# Patient Record
Sex: FEMALE | Race: WHITE
[De-identification: names, ages, dates, MRNs, and addresses within clinical notes are randomized per-mention and may not be internally consistent; named-entity substitution may affect disease eponyms.]

---

## 2018-02-02 PROBLEM — Z00.00 ENCOUNTER FOR PREVENTIVE HEALTH EXAMINATION: Status: ACTIVE | Noted: 2018-02-02

## 2018-02-06 ENCOUNTER — TRANSCRIPTION ENCOUNTER (OUTPATIENT)
Age: 23
End: 2018-02-06

## 2018-02-07 ENCOUNTER — APPOINTMENT (OUTPATIENT)
Dept: ORTHOPEDIC SURGERY | Facility: CLINIC | Age: 23
End: 2018-02-07
Payer: COMMERCIAL

## 2018-02-07 VITALS — BODY MASS INDEX: 27.32 KG/M2 | HEIGHT: 66 IN | WEIGHT: 170 LBS

## 2018-02-07 DIAGNOSIS — Z78.9 OTHER SPECIFIED HEALTH STATUS: ICD-10-CM

## 2018-02-07 DIAGNOSIS — M75.20 BICIPITAL TENDINITIS, UNSPECIFIED SHOULDER: ICD-10-CM

## 2018-02-07 DIAGNOSIS — Z86.39 PERSONAL HISTORY OF OTHER ENDOCRINE, NUTRITIONAL AND METABOLIC DISEASE: ICD-10-CM

## 2018-02-07 PROCEDURE — 20606 DRAIN/INJ JOINT/BURSA W/US: CPT | Mod: RT

## 2018-02-07 PROCEDURE — 99204 OFFICE O/P NEW MOD 45 MIN: CPT | Mod: 25

## 2018-02-07 RX ORDER — LEVOTHYROXINE SODIUM 0.03 MG/1
25 TABLET ORAL
Refills: 0 | Status: ACTIVE | COMMUNITY

## 2018-02-07 RX ORDER — MELOXICAM 15 MG/1
15 TABLET ORAL DAILY
Qty: 30 | Refills: 0 | Status: ACTIVE | COMMUNITY
Start: 2018-02-07 | End: 1900-01-01

## 2018-03-22 ENCOUNTER — FORM ENCOUNTER (OUTPATIENT)
Age: 23
End: 2018-03-22

## 2018-03-23 ENCOUNTER — OUTPATIENT (OUTPATIENT)
Dept: OUTPATIENT SERVICES | Facility: HOSPITAL | Age: 23
LOS: 1 days | End: 2018-03-23

## 2018-03-23 ENCOUNTER — APPOINTMENT (OUTPATIENT)
Dept: ORTHOPEDIC SURGERY | Facility: CLINIC | Age: 23
End: 2018-03-23
Payer: COMMERCIAL

## 2018-03-23 ENCOUNTER — APPOINTMENT (OUTPATIENT)
Dept: RADIOLOGY | Facility: CLINIC | Age: 23
End: 2018-03-23
Payer: COMMERCIAL

## 2018-03-23 VITALS — HEIGHT: 66 IN | WEIGHT: 170 LBS | BODY MASS INDEX: 27.32 KG/M2 | RESPIRATION RATE: 16 BRPM

## 2018-03-23 PROCEDURE — 99214 OFFICE O/P EST MOD 30 MIN: CPT | Mod: 25

## 2018-03-23 PROCEDURE — 73030 X-RAY EXAM OF SHOULDER: CPT | Mod: 26,RT

## 2018-03-23 PROCEDURE — 73030 X-RAY EXAM OF SHOULDER: CPT | Mod: RT

## 2018-03-23 PROCEDURE — 20611 DRAIN/INJ JOINT/BURSA W/US: CPT | Mod: RT

## 2018-05-25 ENCOUNTER — APPOINTMENT (OUTPATIENT)
Dept: ORTHOPEDIC SURGERY | Facility: CLINIC | Age: 23
End: 2018-05-25

## 2018-06-04 ENCOUNTER — EMERGENCY (EMERGENCY)
Facility: HOSPITAL | Age: 23
LOS: 1 days | Discharge: HOME | End: 2018-06-04
Admitting: EMERGENCY MEDICINE
Payer: SELF-PAY

## 2018-06-04 VITALS
WEIGHT: 164.02 LBS | DIASTOLIC BLOOD PRESSURE: 80 MMHG | OXYGEN SATURATION: 99 % | RESPIRATION RATE: 16 BRPM | TEMPERATURE: 98 F | HEART RATE: 106 BPM | SYSTOLIC BLOOD PRESSURE: 121 MMHG

## 2018-06-04 DIAGNOSIS — M25.512 PAIN IN LEFT SHOULDER: ICD-10-CM

## 2018-06-04 PROCEDURE — 73030 X-RAY EXAM OF SHOULDER: CPT | Mod: 26,LT

## 2018-06-04 PROCEDURE — 99283 EMERGENCY DEPT VISIT LOW MDM: CPT | Mod: 25

## 2018-06-04 NOTE — ED PROVIDER NOTE - MUSCULOSKELETAL, MLM
Spine appears normal, range of motion is not limited, +tenderness to anterior left shoulder, distally NVI

## 2018-06-04 NOTE — ED PROVIDER NOTE - DIAGNOSTIC INTERPRETATION
Interpreted by ED physician  left shoulder x-ray, 3 views  No fracture, no dislocation (joint spaces grossly normal), no Foreign Body noted, soft tissue normal

## 2018-06-04 NOTE — ED PROVIDER NOTE - OBJECTIVE STATEMENT
Pt is 23 yo female with no sig pmhx who presents with left shoulder pain x 2 days that began suddenly while lifting and heard a "pop".  Pt denies any weakness, chest pain, sob, numbness, tingling or neck pain.  Pt reports pain worse with movement.  Denies any relieving symptoms.

## 2018-06-04 NOTE — ED ADULT NURSE NOTE - CHPI ED SYMPTOMS NEG
no tingling/no deformity/no bruising/no back pain/no stiffness/no fever/no abrasion/no weakness/no numbness/no difficulty bearing weight

## 2018-06-04 NOTE — ED ADULT TRIAGE NOTE - CHIEF COMPLAINT QUOTE
Pt with complaint of left shoulder pain since Saturday. States she heard a pop on the affected area at that time and has had pain and limited range of motion. No deformities noted.

## 2018-06-13 ENCOUNTER — APPOINTMENT (OUTPATIENT)
Dept: ORTHOPEDIC SURGERY | Facility: CLINIC | Age: 23
End: 2018-06-13

## 2018-07-16 ENCOUNTER — APPOINTMENT (OUTPATIENT)
Dept: ORTHOPEDIC SURGERY | Facility: CLINIC | Age: 23
End: 2018-07-16
Payer: COMMERCIAL

## 2018-07-16 VITALS — RESPIRATION RATE: 16 BRPM | WEIGHT: 170 LBS | BODY MASS INDEX: 27.32 KG/M2 | HEIGHT: 66 IN

## 2018-07-16 DIAGNOSIS — G25.89 OTHER SPECIFIED EXTRAPYRAMIDAL AND MOVEMENT DISORDERS: ICD-10-CM

## 2018-07-16 DIAGNOSIS — M75.21 BICIPITAL TENDINITIS, RIGHT SHOULDER: ICD-10-CM

## 2018-07-16 DIAGNOSIS — M19.011 PRIMARY OSTEOARTHRITIS, RIGHT SHOULDER: ICD-10-CM

## 2018-07-16 PROCEDURE — 99213 OFFICE O/P EST LOW 20 MIN: CPT

## 2018-11-06 ENCOUNTER — EMERGENCY (EMERGENCY)
Facility: HOSPITAL | Age: 23
LOS: 1 days | Discharge: HOME | End: 2018-11-06
Attending: EMERGENCY MEDICINE | Admitting: EMERGENCY MEDICINE
Payer: COMMERCIAL

## 2018-11-06 VITALS
SYSTOLIC BLOOD PRESSURE: 112 MMHG | DIASTOLIC BLOOD PRESSURE: 75 MMHG | RESPIRATION RATE: 18 BRPM | TEMPERATURE: 98 F | OXYGEN SATURATION: 99 % | HEART RATE: 83 BPM

## 2018-11-06 DIAGNOSIS — S03.02XA DISLOCATION OF JAW, LEFT SIDE, INITIAL ENCOUNTER: ICD-10-CM

## 2018-11-06 DIAGNOSIS — K13.79 OTHER LESIONS OF ORAL MUCOSA: ICD-10-CM
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
## 2018-11-06 PROCEDURE — 99284 EMERGENCY DEPT VISIT MOD MDM: CPT

## 2018-11-06 PROCEDURE — 70110 X-RAY EXAM OF JAW 4/> VIEWS: CPT | Mod: 26

## 2018-11-06 RX ORDER — KETOROLAC TROMETHAMINE 30 MG/ML
60 SYRINGE (ML) INJECTION ONCE
Refills: 0 | Status: DISCONTINUED | OUTPATIENT
Start: 2018-11-06 | End: 2018-11-06

## 2018-11-06 RX ADMIN — Medication 30 MILLIGRAM(S): at 21:43

## 2018-11-06 RX ADMIN — Medication 60 MILLIGRAM(S): at 21:20

## 2018-11-06 NOTE — ED ADULT NURSE NOTE - NSIMPLEMENTINTERV_GEN_ALL_ED
Implemented All Universal Safety Interventions:  [place] to call system. Call bell, personal items and telephone within reach. Instruct patient to call for assistance. Room bathroom lighting operational. Non-slip footwear when patient is off stretcher. Physically safe environment: no spills, clutter or unnecessary equipment. Stretcher in lowest position, wheels locked, appropriate side rails in place.

## 2018-11-07 VITALS
DIASTOLIC BLOOD PRESSURE: 61 MMHG | HEART RATE: 80 BPM | RESPIRATION RATE: 18 BRPM | OXYGEN SATURATION: 99 % | SYSTOLIC BLOOD PRESSURE: 104 MMHG

## 2018-11-07 PROCEDURE — 70486 CT MAXILLOFACIAL W/O DYE: CPT | Mod: 26

## 2018-11-07 RX ORDER — IBUPROFEN 200 MG
1 TABLET ORAL
Qty: 28 | Refills: 0
Start: 2018-11-07 | End: 2018-11-13

## 2018-11-07 RX ORDER — CYCLOBENZAPRINE HYDROCHLORIDE 10 MG/1
10 TABLET, FILM COATED ORAL ONCE
Refills: 0 | Status: COMPLETED | OUTPATIENT
Start: 2018-11-07 | End: 2018-11-07

## 2018-11-07 RX ORDER — CYCLOBENZAPRINE HYDROCHLORIDE 10 MG/1
1 TABLET, FILM COATED ORAL
Qty: 15 | Refills: 0
Start: 2018-11-07 | End: 2018-11-11

## 2018-11-07 RX ADMIN — CYCLOBENZAPRINE HYDROCHLORIDE 10 MILLIGRAM(S): 10 TABLET, FILM COATED ORAL at 01:27

## 2018-11-07 NOTE — ED PROVIDER NOTE - MOUTH
mild ttp over L lateral mandible in region of condyle, no crepitus, no echymosis or deformities. patient with mild trismus, but able to almost fully open mouth, mild protrusion of lower incisors with smiling. no clicking, no crepitus. no deformities otherwise.

## 2018-11-07 NOTE — ED PROVIDER NOTE - PROGRESS NOTE DETAILS
spoke to PMFS on call at [facility]. Dr Josef Ackerman, she can see the patient tomorrow in the office. patient agrees with this plan. relocation attempted at bedside, not successful with manual relocation attempt. patient has likely been dislocated for 2 weeks. advised strict follow up with omfs, but at this time, patient notes she has exams in school and will call tomorrow for follow up but it may coincide with exams. advised strict follow up for relocation, or return to ED for omfs consultation. agrees with plan

## 2018-11-07 NOTE — ED PROVIDER NOTE - CARE PROVIDER_API CALL
Josef Ackerman (MD; DDS), Otolaryngology  Head and Neck Surgery  [address]  [place] [address]  Phone: [phone]  Fax: [phone]

## 2018-11-07 NOTE — ED PROVIDER NOTE - OBJECTIVE STATEMENT
patient with no pmhx, presents with 2 weeks of jaw tightness, and L sided swelling. patient notes hx of TMJ, with dislocation in the past, s/p botox injections and relocation with specialist. patient notes symptoms started 2 weeks ago while eating something solid, and hard. felt a tightness to L side, and since then with mildly protruding lower jaw. denies drooling, N/V, or inability to eat or drink. denies headache. denies neck pain or stiffness.

## 2019-07-27 ENCOUNTER — TRANSCRIPTION ENCOUNTER (OUTPATIENT)
Age: 24
End: 2019-07-27

## 2019-09-11 ENCOUNTER — APPOINTMENT (OUTPATIENT)
Dept: ORTHOPEDIC SURGERY | Facility: CLINIC | Age: 24
End: 2019-09-11
Payer: MEDICAID

## 2019-09-11 VITALS — RESPIRATION RATE: 16 BRPM | WEIGHT: 170 LBS | HEIGHT: 66 IN | BODY MASS INDEX: 27.32 KG/M2

## 2019-09-11 DIAGNOSIS — M25.461 EFFUSION, RIGHT KNEE: ICD-10-CM

## 2019-09-11 PROCEDURE — 99214 OFFICE O/P EST MOD 30 MIN: CPT

## 2019-09-16 ENCOUNTER — APPOINTMENT (OUTPATIENT)
Dept: MRI IMAGING | Facility: CLINIC | Age: 24
End: 2019-09-16

## 2020-10-21 ENCOUNTER — APPOINTMENT (OUTPATIENT)
Dept: ORTHOPEDIC SURGERY | Facility: CLINIC | Age: 25
End: 2020-10-21
Payer: COMMERCIAL

## 2020-10-21 ENCOUNTER — RESULT REVIEW (OUTPATIENT)
Age: 25
End: 2020-10-21

## 2020-10-21 ENCOUNTER — OUTPATIENT (OUTPATIENT)
Dept: OUTPATIENT SERVICES | Facility: HOSPITAL | Age: 25
LOS: 1 days | End: 2020-10-21
Payer: COMMERCIAL

## 2020-10-21 VITALS — RESPIRATION RATE: 16 BRPM | HEIGHT: 66 IN | BODY MASS INDEX: 27.32 KG/M2 | WEIGHT: 170 LBS

## 2020-10-21 DIAGNOSIS — M22.8X1 OTHER DISORDERS OF PATELLA, RIGHT KNEE: ICD-10-CM

## 2020-10-21 DIAGNOSIS — M23.91 UNSPECIFIED INTERNAL DERANGEMENT OF RIGHT KNEE: ICD-10-CM

## 2020-10-21 DIAGNOSIS — S83.241A OTHER TEAR OF MEDIAL MENISCUS, CURRENT INJURY, RIGHT KNEE, INITIAL ENCOUNTER: ICD-10-CM

## 2020-10-21 PROCEDURE — 73562 X-RAY EXAM OF KNEE 3: CPT

## 2020-10-21 PROCEDURE — 99214 OFFICE O/P EST MOD 30 MIN: CPT

## 2020-10-21 PROCEDURE — 73562 X-RAY EXAM OF KNEE 3: CPT | Mod: 26,RT

## 2020-10-21 PROCEDURE — 99072 ADDL SUPL MATRL&STAF TM PHE: CPT

## 2020-12-11 ENCOUNTER — APPOINTMENT (OUTPATIENT)
Dept: ORTHOPEDIC SURGERY | Facility: CLINIC | Age: 25
End: 2020-12-11

## 2024-10-02 ENCOUNTER — INPATIENT (INPATIENT)
Facility: HOSPITAL | Age: 29
LOS: 1 days | Discharge: HOME | End: 2024-10-04
Attending: STUDENT IN AN ORGANIZED HEALTH CARE EDUCATION/TRAINING PROGRAM | Admitting: STUDENT IN AN ORGANIZED HEALTH CARE EDUCATION/TRAINING PROGRAM
Payer: COMMERCIAL

## 2024-10-02 VITALS
DIASTOLIC BLOOD PRESSURE: 89 MMHG | WEIGHT: 138.89 LBS | RESPIRATION RATE: 18 BRPM | HEIGHT: 66 IN | OXYGEN SATURATION: 99 % | SYSTOLIC BLOOD PRESSURE: 128 MMHG | HEART RATE: 90 BPM | TEMPERATURE: 98 F

## 2024-10-02 DIAGNOSIS — F32.0 MAJOR DEPRESSIVE DISORDER, SINGLE EPISODE, MILD: ICD-10-CM

## 2024-10-02 LAB
ALBUMIN SERPL ELPH-MCNC: 4.5 G/DL — SIGNIFICANT CHANGE UP (ref 3.3–5)
ALP SERPL-CCNC: 73 U/L — SIGNIFICANT CHANGE UP (ref 40–120)
ALT FLD-CCNC: 9 U/L — SIGNIFICANT CHANGE UP (ref 4–33)
AMPHET UR-MCNC: NEGATIVE — SIGNIFICANT CHANGE UP
ANION GAP SERPL CALC-SCNC: 12 MMOL/L — SIGNIFICANT CHANGE UP (ref 7–14)
APAP SERPL-MCNC: <10 UG/ML — LOW (ref 15–25)
APPEARANCE UR: ABNORMAL
AST SERPL-CCNC: 12 U/L — SIGNIFICANT CHANGE UP (ref 4–32)
BACTERIA # UR AUTO: ABNORMAL /HPF
BARBITURATES UR SCN-MCNC: NEGATIVE — SIGNIFICANT CHANGE UP
BASOPHILS # BLD AUTO: 0.06 K/UL — SIGNIFICANT CHANGE UP (ref 0–0.2)
BASOPHILS NFR BLD AUTO: 0.7 % — SIGNIFICANT CHANGE UP (ref 0–2)
BENZODIAZ UR-MCNC: NEGATIVE — SIGNIFICANT CHANGE UP
BILIRUB SERPL-MCNC: 0.4 MG/DL — SIGNIFICANT CHANGE UP (ref 0.2–1.2)
BILIRUB UR-MCNC: NEGATIVE — SIGNIFICANT CHANGE UP
BUN SERPL-MCNC: 11 MG/DL — SIGNIFICANT CHANGE UP (ref 7–23)
CALCIUM SERPL-MCNC: 9.6 MG/DL — SIGNIFICANT CHANGE UP (ref 8.4–10.5)
CAST: 0 /LPF — SIGNIFICANT CHANGE UP (ref 0–4)
CHLORIDE SERPL-SCNC: 103 MMOL/L — SIGNIFICANT CHANGE UP (ref 98–107)
CO2 SERPL-SCNC: 23 MMOL/L — SIGNIFICANT CHANGE UP (ref 22–31)
COCAINE METAB.OTHER UR-MCNC: NEGATIVE — SIGNIFICANT CHANGE UP
COLOR SPEC: YELLOW — SIGNIFICANT CHANGE UP
CREAT SERPL-MCNC: 0.6 MG/DL — SIGNIFICANT CHANGE UP (ref 0.5–1.3)
CREATININE URINE RESULT, DAU: 129 MG/DL — SIGNIFICANT CHANGE UP
DIFF PNL FLD: NEGATIVE — SIGNIFICANT CHANGE UP
EGFR: 125 ML/MIN/1.73M2 — SIGNIFICANT CHANGE UP
EOSINOPHIL # BLD AUTO: 0.02 K/UL — SIGNIFICANT CHANGE UP (ref 0–0.5)
EOSINOPHIL NFR BLD AUTO: 0.2 % — SIGNIFICANT CHANGE UP (ref 0–6)
ETHANOL SERPL-MCNC: <10 MG/DL — SIGNIFICANT CHANGE UP
FENTANYL UR QL SCN: NEGATIVE — SIGNIFICANT CHANGE UP
GLUCOSE SERPL-MCNC: 103 MG/DL — HIGH (ref 70–99)
GLUCOSE UR QL: NEGATIVE MG/DL — SIGNIFICANT CHANGE UP
HCG SERPL-ACNC: <1 MIU/ML — SIGNIFICANT CHANGE UP
HCT VFR BLD CALC: 38.8 % — SIGNIFICANT CHANGE UP (ref 34.5–45)
HGB BLD-MCNC: 13.6 G/DL — SIGNIFICANT CHANGE UP (ref 11.5–15.5)
IANC: 6.75 K/UL — SIGNIFICANT CHANGE UP (ref 1.8–7.4)
IMM GRANULOCYTES NFR BLD AUTO: 0.2 % — SIGNIFICANT CHANGE UP (ref 0–0.9)
KETONES UR-MCNC: ABNORMAL MG/DL
LEUKOCYTE ESTERASE UR-ACNC: NEGATIVE — SIGNIFICANT CHANGE UP
LYMPHOCYTES # BLD AUTO: 1.33 K/UL — SIGNIFICANT CHANGE UP (ref 1–3.3)
LYMPHOCYTES # BLD AUTO: 15.4 % — SIGNIFICANT CHANGE UP (ref 13–44)
MCHC RBC-ENTMCNC: 30.5 PG — SIGNIFICANT CHANGE UP (ref 27–34)
MCHC RBC-ENTMCNC: 35.1 GM/DL — SIGNIFICANT CHANGE UP (ref 32–36)
MCV RBC AUTO: 87 FL — SIGNIFICANT CHANGE UP (ref 80–100)
METHADONE UR-MCNC: NEGATIVE — SIGNIFICANT CHANGE UP
MONOCYTES # BLD AUTO: 0.44 K/UL — SIGNIFICANT CHANGE UP (ref 0–0.9)
MONOCYTES NFR BLD AUTO: 5.1 % — SIGNIFICANT CHANGE UP (ref 2–14)
NEUTROPHILS # BLD AUTO: 6.75 K/UL — SIGNIFICANT CHANGE UP (ref 1.8–7.4)
NEUTROPHILS NFR BLD AUTO: 78.4 % — HIGH (ref 43–77)
NITRITE UR-MCNC: NEGATIVE — SIGNIFICANT CHANGE UP
NRBC # BLD: 0 /100 WBCS — SIGNIFICANT CHANGE UP (ref 0–0)
NRBC # FLD: 0 K/UL — SIGNIFICANT CHANGE UP (ref 0–0)
OPIATES UR-MCNC: NEGATIVE — SIGNIFICANT CHANGE UP
OXYCODONE UR-MCNC: NEGATIVE — SIGNIFICANT CHANGE UP
PCP SPEC-MCNC: SIGNIFICANT CHANGE UP
PCP UR-MCNC: NEGATIVE — SIGNIFICANT CHANGE UP
PH UR: 6 — SIGNIFICANT CHANGE UP (ref 5–8)
PLATELET # BLD AUTO: 351 K/UL — SIGNIFICANT CHANGE UP (ref 150–400)
POTASSIUM SERPL-MCNC: 4.2 MMOL/L — SIGNIFICANT CHANGE UP (ref 3.5–5.3)
POTASSIUM SERPL-SCNC: 4.2 MMOL/L — SIGNIFICANT CHANGE UP (ref 3.5–5.3)
PROT SERPL-MCNC: 8 G/DL — SIGNIFICANT CHANGE UP (ref 6–8.3)
PROT UR-MCNC: NEGATIVE MG/DL — SIGNIFICANT CHANGE UP
RBC # BLD: 4.46 M/UL — SIGNIFICANT CHANGE UP (ref 3.8–5.2)
RBC # FLD: 11.5 % — SIGNIFICANT CHANGE UP (ref 10.3–14.5)
RBC CASTS # UR COMP ASSIST: 3 /HPF — SIGNIFICANT CHANGE UP (ref 0–4)
REVIEW: SIGNIFICANT CHANGE UP
SALICYLATES SERPL-MCNC: <0.3 MG/DL — LOW (ref 15–30)
SARS-COV-2 RNA SPEC QL NAA+PROBE: SIGNIFICANT CHANGE UP
SODIUM SERPL-SCNC: 138 MMOL/L — SIGNIFICANT CHANGE UP (ref 135–145)
SP GR SPEC: 1.02 — SIGNIFICANT CHANGE UP (ref 1–1.03)
SQUAMOUS # UR AUTO: 22 /HPF — HIGH (ref 0–5)
THC UR QL: NEGATIVE — SIGNIFICANT CHANGE UP
TOXICOLOGY SCREEN, DRUGS OF ABUSE, SERUM RESULT: SIGNIFICANT CHANGE UP
TSH SERPL-MCNC: 0.16 UIU/ML — LOW (ref 0.27–4.2)
UROBILINOGEN FLD QL: 0.2 MG/DL — SIGNIFICANT CHANGE UP (ref 0.2–1)
WBC # BLD: 8.62 K/UL — SIGNIFICANT CHANGE UP (ref 3.8–10.5)
WBC # FLD AUTO: 8.62 K/UL — SIGNIFICANT CHANGE UP (ref 3.8–10.5)
WBC UR QL: 3 /HPF — SIGNIFICANT CHANGE UP (ref 0–5)

## 2024-10-02 RX ORDER — HALOPERIDOL LACTATE 2 MG/ML
2 CONCENTRATE, ORAL ORAL EVERY 6 HOURS
Refills: 0 | Status: DISCONTINUED | OUTPATIENT
Start: 2024-10-03 | End: 2024-10-04

## 2024-10-02 NOTE — ED ADULT TRIAGE NOTE - CHIEF COMPLAINT QUOTE
Pt with history of Hashimoto's , Anxiety comes in c/o having suicidal thoughts  ( has also a plan but does not want to elaborate to the triage RN , wants to give detail information to the psychiatrist) since few weeks ago. She went to her psychiatrist who started her on Zoloft 25 mg two weeks ago but it is not working. Pt is not sleeping and is anxious. Pt is calm and cooperative in triage Pt with history of Hashimoto's ,insulin resistance,  Anxiety comes in c/o having suicidal thoughts  ( has also a plan but does not want to elaborate to the triage RN , wants to give detail information to the psychiatrist) since few weeks ago. She went to her psychiatrist who started her on Zoloft 25 mg two weeks ago but it is not working. Pt is not sleeping and is anxious. Pt is calm and cooperative in triage

## 2024-10-02 NOTE — ED BEHAVIORAL HEALTH NOTE - BEHAVIORAL HEALTH NOTE
as per request of provider, writer contacted pt’s sister Cristina ([phone]) to obtain collateral information. the following information is per the sister.    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    reason for ed visit: SI w/ a plan. Pt stated since yesterday she had a plan of using insulin to end her life.    symptoms/hx:  Sister reports pt has a hx of passive si thoughts. Over the past 6 months she says pt has endorsed passive si which has been increasing. She says it has been daily recently and yesterday pt shared a plan of overdosing on her brother's insulin. She says pt had a plan one other time in august 2024 to overdose and was seen at [facility] crisis center. Patient has no past suicide attempts or episodes of self. She says pt has not endorsed any AVH , paranoia or delusions. she says pt has been sleeping poorly but this is at baseline. pt’s hygeine and appetite are also at baseline and are okay. she says pt is still attending work but feels like she overworks herself. she says the pt masks herself well in regards to her symptoms and usually only opens up with the sister.  She says pt has been having panic attacks weekly.    Baseline: she says pt has anxiety at baseline but can manage it better. She describes the pt as outgoing.     Stressors: unknown.    Medical problems: lime disease and hashimotos disease.    Medication: Zoloft 25mg which she stopped a few days ago. Synthroid 125 mcg and zepbound inection 2.5mg.     Treatment team. Pt linked to dr kalia wilson. Pt has no past psych admissions.    Family hx: paternal grandfather committed suicide.    Violence/aggression. pt is not violent or aggressive. Pt has no access to firearms or weapons.    Dispo: sister is advocating for psych admission. as per request of provider, writer contacted pt’s sister Cristina ([phone]) to obtain collateral information. the following information is per the sister.    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    reason for ed visit: SI w/ a plan. Pt stated since yesterday she had a plan of using insulin to end her life.    symptoms/hx:  Sister reports pt has a hx of passive si thoughts. Over the past 6 months she says pt has endorsed passive si which has been increasing. She says it has been daily recently and yesterday pt shared a plan of overdosing on her brother's insulin. She says pt had a plan one other time in august 2024 to overdose and was seen at [facility] crisis center. Patient has no past suicide attempts or episodes of self. She says pt has not endorsed any AVH , paranoia or delusions. she says pt has been sleeping poorly but this is at baseline. pt’s hygeine and appetite are also at baseline and are okay. she says pt is still attending work but feels like she overworks herself. she says the pt masks herself well in regards to her symptoms and usually only opens up with the sister.  She says pt has been having panic attacks weekly.    Baseline: she says pt has anxiety at baseline but can manage it better. She describes the pt as outgoing.     Stressors: unknown.    Medical problems: lime disease and hashimotos disease.    Medication: Zoloft 25mg which she stopped a few days ago. Synthroid 125 mcg and zepbound inection 2.5mg.     Treatment team. Pt linked to dr kalia wilson. Pt has no past psych admissions.    Family hx: paternal grandfather committed suicide.    Violence/aggression. pt is not violent or aggressive. Pt has no access to firearms or weapons.    Dispo: sister is advocating for psych admission.    Writer informed sister of patient's admission.

## 2024-10-02 NOTE — ED ADULT NURSE NOTE - OBJECTIVE STATEMENT
Pt is A&O x 4, ambulatory w/o assistance, shows no signs of acute distress. Presents to the ED w/ worsening anxiety and suicidal ideation. Pt states she has suffered from anxiety for "a while" but symptoms have increased recently. 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 States she is also grieving the death of her father who passed "a few yrs ago". Pt endorses having a plan accompanied w/ SI, states she thought about injecting herself with insulin while she was at work. Endorses strong support system at home, preventing her from following through with her plan. Pt sees a psychiatrist regularly. Currently on Zoloft 50mg, endorses compliance with medication. Denies auditory/visual/tactile hallucinations or HI. Also denies any medical symptoms at this time. Placed in  room 2, pending psychiatric consultation.

## 2024-10-02 NOTE — ED BEHAVIORAL HEALTH ASSESSMENT NOTE - OTHER PAST PSYCHIATRIC HISTORY (INCLUDE DETAILS REGARDING ONSET, COURSE OF ILLNESS, INPATIENT/OUTPATIENT TREATMENT)
see HPI.   pt was in therapy from January to March, then she was is [facility] and in August she started seeing a psychiatrist, dr kalia Cardozo , pt was started on zoloft which was increased to 50 mg today.

## 2024-10-02 NOTE — ED BEHAVIORAL HEALTH ASSESSMENT NOTE - CURRENT INTENT
Spoke to pt to schedule procedure(s) Upper Endoscopy (EGD)       Physician to perform procedure(s) Dr. KULDIP Lehman  Date of Procedure (s) 5/17/24  Arrival Time 8:00 AM  Time of Procedure(s) 9:00 AM   Location of Procedure(s) [address]  Type of Rx Prep sent to patient: N/A  Instructions provided to patient via [facility]    Patient was informed on the following information and verbalized understanding. Screening questionnaire reviewed with patient and complete. If procedure requires anesthesia, a responsible adult needs to be present to accompany the patient home, patient cannot drive after receiving anesthesia. Appointment details are tentative, especially check-in time. Patient will receive a prep-op call 7 days prior to confirm check-in time for procedure. If applicable the patient should contact their pharmacy to verify Rx for procedure prep is ready for pick-up. Patient was advised to call the scheduling department at [phone] if pharmacy states no Rx is available. Patient was advised to call the endoscopy scheduling department if any questions or concerns arise.      [facility] Scheduling Department     No

## 2024-10-02 NOTE — ED ADULT NURSE NOTE - CHIEF COMPLAINT QUOTE
Pt with history of Hashimoto's ,insulin resistance,  Anxiety comes in c/o having suicidal thoughts  ( has also a plan but does not want to elaborate to the triage RN , wants to give detail information to the psychiatrist) since few weeks ago. She went to her psychiatrist who started her on Zoloft 25 mg two weeks ago but it is not working. Pt is not sleeping and is anxious. Pt is calm and cooperative in triage

## 2024-10-02 NOTE — ED BEHAVIORAL HEALTH ASSESSMENT NOTE - HPI (INCLUDE ILLNESS QUALITY, SEVERITY, DURATION, TIMING, CONTEXT, MODIFYING FACTORS, ASSOCIATED SIGNS AND SYMPTOMS)
the patient is 28 yo single; employed as an RN at [employer], non-caregiver, resides alone; no HX of trauma, no Hx of substance abuse, PMHx of IDDM and hypothyroidism; PPHx; no psych admissions, no Hx of SA,  no HX of psych admissions, Hx of depression was BIB her sister secondary to worsening of depression and anxiety and SI.   During evaluation patient is cooperative, but anxious, she reports that she has been suffering from depression and anxiety "off and on" , she is currently in treatment with Dr Cardozo in [place], on zoloft. She reporots that last week her depression and anxiety got worse, does not know the trigger. She reports that she is sleeping "off and on" Her energy level is far, she is able to concentrate for the most of the time, She denies any excessive guilt.; denies feeling hopeless, but sometimes she feels helpless. She reports that she started feeling very anxious and "developed" SI, she states that she feels like OD herself on her brother's insulin. She denies any intent at present time. No anhedonia, states that she is able to work, No psychotic or manic symptoms, no voices, visions or delusions, no diminished need for sleep or goal directed activity, no pressured speech or racing thoughts. patient denies any Ah/VH, no paranoia, IOR, grandiosity. the patient is 30 yo single; employed as an RN at [employer], non-caregiver, resides alone; no HX of trauma, no Hx of substance abuse, PMHx of insulin resistance and hypothyroidism; PPHx; no psych admissions, no Hx of SA,  no HX of psych admissions, Hx of depression was BIB her sister secondary to worsening of depression and anxiety and SI.   During evaluation patient is cooperative, but anxious, she reports that she has been suffering from depression and anxiety "off and on" , she is currently in treatment with Dr Cardozo in [place], on zoloft. She reporots that last week her depression and anxiety got worse, does not know the trigger. She reports that she is sleeping "off and on" Her energy level is far, she is able to concentrate for the most of the time, She denies any excessive guilt.; denies feeling hopeless, but sometimes she feels helpless. She reports that she started feeling very anxious and "developed" SI, she states that she feels like OD herself on her brother's insulin. She denies any intent at present time. No anhedonia, states that she is able to work, No psychotic or manic symptoms, no voices, visions or delusions, no diminished need for sleep or goal directed activity, no pressured speech or racing thoughts. patient denies any Ah/VH, no paranoia, IOR, grandiosity.

## 2024-10-02 NOTE — ED PROVIDER NOTE - CLINICAL SUMMARY MEDICAL DECISION MAKING FREE TEXT BOX
28 yo F PMH Hashimoto Disease, Anxiety p/w increased SI with plan to overdose herself with an insulin injection. Reports she has been started on zoloft 25mg, recently changed to 50mg but has not taken yet. Saw her psychiatrist today and did not make him aware, tried to call him back to explain how she felt but he did not . Patient then decided to come to the ED. 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 Patient admits her triggers are: her experience as a trauma nurse and the fact that her father  x couple years ago. Admits close family support: sister, close friends and her psychiatrist. Denies fever, headache, dizziness, HI/AH/VH, chills, chest pain, shortness of breath, abdominal pain, sick contact or recent travel. Denies alcohol use or other drugs.   Labs, EKG, COVID  SW collateral  Psych consult  Dispo as per consult

## 2024-10-02 NOTE — ED BEHAVIORAL HEALTH ASSESSMENT NOTE - DESCRIPTION
Hashimoto's and lyme disease, DM cooperative  Vital Signs Last 24 Hrs  T(C): 36.7 (02 Oct 2024 21:12), Max: 36.7 (02 Oct 2024 21:12)  T(F): 98 (02 Oct 2024 21:12), Max: 98 (02 Oct 2024 21:12)  HR: 90 (02 Oct 2024 21:12) (90 - 90)  BP: 128/89 (02 Oct 2024 21:12) (128/89 - 128/89)  BP(mean): --  RR: 18 (02 Oct 2024 21:12) (18 - 18)  SpO2: 99% (02 Oct 2024 21:12) (99% - 99%)    Parameters below as of 02 Oct 2024 21:12  Patient On (Oxygen Delivery Method): room air see HPI

## 2024-10-02 NOTE — ED PROVIDER NOTE - NSFOLLOWUPINSTRUCTIONS_ED_ALL_ED_FT
Follow up with your PMD within 48-72 hrs. Show copies of your reports given to you.   Worsening, continued or new concerning symptoms return to the emergency department.    You have been given information necessary to follow up with the  [facility] center & other outpatient  psychiatric clinics within your community    • [facility] walk in Crisis centre  [address] [place] [address]  [phone] [email or web address]  Hours of operation:  Day	                                        Hours  Sunday                                  Closed  Monday                                9am - 3pm  Tuesday                                9am - 3pm  Wednesday                          9am - 3pm  Thursday                               9am - 3pm  Friday                                    9am - 3pm  Saturday                                Closed

## 2024-10-02 NOTE — ED BEHAVIORAL HEALTH ASSESSMENT NOTE - SUMMARY
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   patient admits to active SI and plan, feels anxious, c/o insomnia sometimes, She has Hx of depression with passive SI, but she became actively suicidal "almost a week ago" She has a plan to OD herself on her brother's insulin, she denies any intent, but agrees to voluntary admission

## 2024-10-02 NOTE — ED BEHAVIORAL HEALTH ASSESSMENT NOTE - AXIS III
hashimotos, Lyme disease hashimotos, Lyme disease, insuline resistance on zepbound once a week; last time she had it on Sunday 9/29

## 2024-10-02 NOTE — ED PROVIDER NOTE - CARE PLAN
1 Principal Discharge DX:	Current mild episode of major depressive disorder, unspecified whether recurrent

## 2024-10-03 DIAGNOSIS — F32.0 MAJOR DEPRESSIVE DISORDER, SINGLE EPISODE, MILD: ICD-10-CM

## 2024-10-03 DIAGNOSIS — E06.3 AUTOIMMUNE THYROIDITIS: ICD-10-CM

## 2024-10-03 DIAGNOSIS — E03.9 HYPOTHYROIDISM, UNSPECIFIED: ICD-10-CM

## 2024-10-03 LAB
A1C WITH ESTIMATED AVERAGE GLUCOSE RESULT: 4.9 % — SIGNIFICANT CHANGE UP (ref 4–5.6)
ADD ON TEST-SPECIMEN IN LAB: SIGNIFICANT CHANGE UP
CHOLEST SERPL-MCNC: 229 MG/DL — HIGH
ESTIMATED AVERAGE GLUCOSE: 94 — SIGNIFICANT CHANGE UP
HDLC SERPL-MCNC: 56 MG/DL — SIGNIFICANT CHANGE UP
LIPID PNL WITH DIRECT LDL SERPL: 158 MG/DL — HIGH
NON HDL CHOLESTEROL: 173 MG/DL — HIGH
TRIGL SERPL-MCNC: 73 MG/DL — SIGNIFICANT CHANGE UP

## 2024-10-03 RX ORDER — SERTRALINE HYDROCHLORIDE 100 MG/1
50 TABLET, FILM COATED ORAL DAILY
Refills: 0 | Status: DISCONTINUED | OUTPATIENT
Start: 2024-10-03 | End: 2024-10-03

## 2024-10-03 RX ORDER — SERTRALINE HYDROCHLORIDE 100 MG/1
25 TABLET, FILM COATED ORAL DAILY
Refills: 0 | Status: DISCONTINUED | OUTPATIENT
Start: 2024-10-03 | End: 2024-10-03

## 2024-10-03 RX ORDER — SERTRALINE HYDROCHLORIDE 100 MG/1
50 TABLET, FILM COATED ORAL DAILY
Refills: 0 | Status: DISCONTINUED | OUTPATIENT
Start: 2024-10-03 | End: 2024-10-04

## 2024-10-03 RX ADMIN — SERTRALINE HYDROCHLORIDE 50 MILLIGRAM(S): 100 TABLET, FILM COATED ORAL at 08:26

## 2024-10-03 RX ADMIN — Medication 125 MICROGRAM(S): at 06:43

## 2024-10-03 RX ADMIN — Medication 1 MILLIGRAM(S): at 01:19

## 2024-10-03 NOTE — BH PSYCHOLOGY - GROUP THERAPY NOTE - TOKEN PULL-DIAGNOSIS
Primary Diagnosis:  MDD (major depressive disorder) [F32.9]        Problem Dx:   Hypothyroidism [E03.9]      Hashimoto's disease [E06.3]      Anxiety [F41.9]      Current mild episode of major depressive disorder, unspecified whether recurrent [F32.0]

## 2024-10-03 NOTE — BH PSYCHOLOGY - CLINICIAN PSYCHOTHERAPY NOTE - NSBHPSYCHOLNARRATIVE_PSY_A_CORE FT
Patient presented with adequate hygiene and grooming, dressed in her own clothes. Patient maintained appropriate eye contact, well engaged. No abnormal motor movements noted. Patient's mood was stable, euthymic, with reactive affect. Speech was within normal limits. No perceptual disturbances noted or observed. Patient's thought process was linear, coherent, and goal directed. Patient's thought content was related to discussion. Patient denied suicidal ideation/intent/plan during session. Pt agreed to approach staff should her thoughts change. No HI endorsed. Fair insight, judgement, and impulse control.     Writer met with Pt for an initial individual therapy session. Writer discussed parameters of treatment and the limits of confidentiality. Writer also focused on establishing rapport with Pt, exploring the stressors contributing to hospitalization and her goals for treatment. Pt disclosed history of anxiety and depression, noting that over the last few years she “hadn’t realized how many things had been building up” and causing stress. Pt detailed a timeline of events: moving into her own apartment after living with her mother and brother her whole life, having a close sibling move out of state, and experiencing a traumatic stressor at work. Pt also recalled the impacts of her father’s relatively recent sudden passing, reflecting on being tasked with taking care of  arrangements and the aftermath of this significant loss, identifying that she “never really mourned” his death. Writer and Pt discussed family patterns of bottling up emotions and not talking about mental health struggles. Pt also shared that she has been distracting herself with work and taking care of others, neglecting herself, which has exacerbated her mood symptoms. Pt went on to describe the ways her anxiety manifests physically, accompanying thoughts and her desire to escape them (which led to SI/plans just prior to admission). Additionally, Pt described recent conversations with her mother that have been “reassuring” as she feels coming to the hospital has helped her family “open up” and express willingness to embrace seeking therapy and talking about their own feelings/struggles rather than avoid them. Writer provided Pt with support, validation, encouragement, and a safe space to share her thoughts and feelings.

## 2024-10-03 NOTE — BH INPATIENT PSYCHIATRY ASSESSMENT NOTE - CURRENT MEDICATION
MEDICATIONS  (STANDING):  levothyroxine 125 MICROGram(s) Oral daily  sertraline 50 milliGRAM(s) Oral daily    MEDICATIONS  (PRN):  haloperidol     Tablet 2 milliGRAM(s) Oral every 6 hours PRN agitation  haloperidol    Injectable 2 milliGRAM(s) IntraMuscular every 6 hours PRN Agitation  LORazepam     Tablet 1 milliGRAM(s) Oral every 6 hours PRN Anxiety  LORazepam   Injectable 2 milliGRAM(s) IntraMuscular every 6 hours PRN Agitation

## 2024-10-03 NOTE — BH SOCIAL WORK INITIAL PSYCHOSOCIAL EVALUATION - NSBHABUSECOMMENTFT_PSY_ALL_CORE
Pt denied hx of trauma and abuse. 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 Pt was screened for exploitation, resulted unremarkable. Pt is without a documented hx of violence and is at low risk of escalating violence on unit.

## 2024-10-03 NOTE — BH PSYCHOLOGY - GROUP THERAPY NOTE - NSPSYCHOLGRPCOGPT_PSY_A_CORE FT
Patient attended Cognitive Behavioral Therapy Group incorporating ACT-based concepts. The group started with a brief check-in asking Pts to share their favorite comfort food. Members then shared their thoughts/reactions to a quote illustrating the concept of thought defusion. Lastly, Group focused on engaging in a discussion about dialectical thinking; exploring opposites that can both be true (ex. wanting support and being independent, being mad at others and loving/respecting them, being with others while also feeling lonely, sharing some things and keeping other things private); while normalizing/validating these experiences. Group facilitator explained concepts, reinforced participation, and engaged patients in the discussion.

## 2024-10-03 NOTE — BH INPATIENT PSYCHIATRY ASSESSMENT NOTE - NSTOBACCOUSAGEY/N_GEN_A_CS
Medication: Losartan  Last office visit date: 10/10/2023  Medication Refill Protocol Failed.  Protocol approved due to: data identified in chart review.     No

## 2024-10-03 NOTE — BH INPATIENT PSYCHIATRY ASSESSMENT NOTE - NSTXDCOTHRGOAL_PSY_ALL_CORE
Pt will engage meaningfully with writer to identify a safe discharge plan. Pt will comply with recommended tx plan and medications for 5 days, identify 2 benefits for adhering to tx.

## 2024-10-03 NOTE — BH PSYCHOLOGY - GROUP THERAPY NOTE - NSBHPSYCHOLRESPCOMMENT_PSY_A_CORE FT
Pt appeared appropriately groomed and casually dressed. Pt appeared fully engaged in the group as evidenced by her willingness to verbally communicate with prompting during the discussion. Pt shared during check-in, and read aloud from worksheet, noting that she is a “pretty independent person” but at times hesitates to ask others for help. Speech was WNL. PT was oriented X3. Pt was appropriate and supportive with peers.

## 2024-10-03 NOTE — BH INPATIENT PSYCHIATRY ASSESSMENT NOTE - NSBHMETABOLIC_PSY_ALL_CORE_FT
BMI: BMI (kg/m2): 22.4 (10-03-24 @ 01:33)  HbA1c:   Glucose: POCT Blood Glucose.: 97 mg/dL (10-02-24 @ 21:26)    BP: 115/72 (10-03-24 @ 00:09) (115/72 - 128/89)Vital Signs Last 24 Hrs  T(C): 36.7 (10-03-24 @ 01:33), Max: 36.8 (10-03-24 @ 00:09)  T(F): 98.1 (10-03-24 @ 01:33), Max: 98.2 (10-03-24 @ 00:09)  HR: 82 (10-03-24 @ 00:09) (82 - 90)  BP: 115/72 (10-03-24 @ 00:09) (115/72 - 128/89)  BP(mean): --  RR: 16 (10-03-24 @ 01:33) (16 - 18)  SpO2: 98% (10-03-24 @ 00:09) (98% - 99%)    Orthostatic VS  10-03-24 @ 01:33  Lying BP: --/-- HR: --  Sitting BP: 115/80 HR: 95  Standing BP: 98/80 HR: 109  Site: --  Mode: --    Lipid Panel:  BMI: BMI (kg/m2): 22.4 (10-03-24 @ 01:33)  HbA1c: A1C with Estimated Average Glucose Result: 4.9 % (10-03-24 @ 08:00)    Glucose: POCT Blood Glucose.: 97 mg/dL (10-02-24 @ 21:26)    BP: 115/72 (10-03-24 @ 00:09) (115/72 - 128/89)Vital Signs Last 24 Hrs  T(C): 36.7 (10-04-24 @ 06:23), Max: 36.7 (10-04-24 @ 06:23)  T(F): 98.1 (10-04-24 @ 06:23), Max: 98.1 (10-04-24 @ 06:23)  HR: --  BP: --  BP(mean): --  RR: --  SpO2: --    Orthostatic VS  10-04-24 @ 06:23  Lying BP: --/-- HR: --  Sitting BP: 119/75 HR: 105  Standing BP: 116/74 HR: 120  Site: --  Mode: --  Orthostatic VS  10-03-24 @ 01:33  Lying BP: --/-- HR: --  Sitting BP: 115/80 HR: 95  Standing BP: 98/80 HR: 109  Site: --  Mode: --    Lipid Panel: Date/Time: 10-03-24 @ 08:00  Cholesterol, Serum: 229  LDL Cholesterol Calculated: 158  HDL Cholesterol, Serum: 56  Total Cholesterol/HDL Ration Measurement: --  Triglycerides, Serum: 73

## 2024-10-03 NOTE — BH INPATIENT PSYCHIATRY ASSESSMENT NOTE - NSBHCHARTREVIEWLAB_PSY_A_CORE FT
Admission labs reviewed no acute findings  utox negative  BAL <10   UA unremarkable  TSH 0.16  HCG< 1.0

## 2024-10-03 NOTE — BH CHART NOTE - NSEVENTNOTEFT_PSY_ALL_CORE
I have reviewed the information from the ED and evaluated the patient.  I confirm that the patient is in need of involuntary care and treatment in an inpatient psychiatric hospital since the patient poses a substantial threat of harm to self or others as a result of his or her mental illness.

## 2024-10-03 NOTE — BH TREATMENT PLAN - NSTXPATIENTPARTICIPATE_PSY_ALL_CORE
Patient participated in identification of needs/problems/goals for treatment/Patient participated in development of after care plan

## 2024-10-03 NOTE — BH INPATIENT PSYCHIATRY ASSESSMENT NOTE - NSBHCHARTREVIEWVS_PSY_A_CORE FT
Vital Signs Last 24 Hrs  T(C): 36.7 (10-03-24 @ 01:33), Max: 36.8 (10-03-24 @ 00:09)  T(F): 98.1 (10-03-24 @ 01:33), Max: 98.2 (10-03-24 @ 00:09)  HR: 82 (10-03-24 @ 00:09) (82 - 90)  BP: 115/72 (10-03-24 @ 00:09) (115/72 - 128/89)  BP(mean): --  RR: 16 (10-03-24 @ 01:33) (16 - 18)  SpO2: 98% (10-03-24 @ 00:09) (98% - 99%)    Orthostatic VS  10-03-24 @ 01:33  Lying BP: --/-- HR: --  Sitting BP: 115/80 HR: 95  Standing BP: 98/80 HR: 109  Site: --  Mode: --   Vital Signs Last 24 Hrs  T(C): 36.7 (10-04-24 @ 06:23), Max: 36.7 (10-04-24 @ 06:23)  T(F): 98.1 (10-04-24 @ 06:23), Max: 98.1 (10-04-24 @ 06:23)  HR: --  BP: --  BP(mean): --  RR: --  SpO2: --    Orthostatic VS  10-04-24 @ 06:23  Lying BP: --/-- HR: --  Sitting BP: 119/75 HR: 105  Standing BP: 116/74 HR: 120  Site: --  Mode: --  Orthostatic VS  10-03-24 @ 01:33  Lying BP: --/-- HR: --  Sitting BP: 115/80 HR: 95  Standing BP: 98/80 HR: 109  Site: --  Mode: --

## 2024-10-03 NOTE — BH INPATIENT PSYCHIATRY ASSESSMENT NOTE - ATTENDING COMMENTS
Chart was reviewed and case discussed with the Psych NP. I agree with the history, examination, assessment and plan as documented in the Psych NP's assessment note and was directly involved in medical decision making. On exam, pt endorses worsening depressed mood prior to admission, however reports mood improved since being on unit and having increased family support about her emotional state. denies current passive or active SI/SP, overall brighter, future oriented, motivated for tx. c/w zoloft for depressed mood.

## 2024-10-03 NOTE — BH INPATIENT PSYCHIATRY ASSESSMENT NOTE - NSBHASSESSSUMMFT_PSY_ALL_CORE
Patient is a  29 year old single female, no dependents.  Patient domiciles in alone private residence, currently employed as an RN at [employer].  Patient has PPH Depression Disorder.  Patient has no prior inpatient hospitalizations, sees outpatient psychiatrist Dr Cardozo and is on Zoloft.  Patient brought into hospital by sister due to worsening depression and anxiety with SI. Patient is hospitalized with a primary problem of emotional decompensation.  Patient admitted to [facility] on a 9.13 legal status. Patient requires inpatient hospitalization due to symptoms of mental illness so severe that they significantly interfere with activities of daily living, and presents a potential danger to self as a result of acute emotional decompensation with active SI.  She is requiring 24-hour care at this time as a result, for psychiatric stabilization and safety.    Plan:  >Legal: 9.13  >Obs: Routine, no current SI. no need for CO, patient not expected to pose risk to self or others in controlled inpatient setting  >Psychiatric Meds: Continue outpatient medication regimen: Observe for tolerability and efficacy.   -Zoloft 50mg daily for depression/anxiety  PRN medications:  Ativan 2mg oral Q6HR PRN for agitation and anxiety.  Haldol 5mg oral Q6HR PRN for agitation.   Benadryl 50mg oral Q6HR PRN for agitation.   >Labs: Admission labs reviewed, no acute findings. Labs pending for today: A1c and Lipid panel. Hold antipsychotics if QTc >500  >Medical:  Hypothyroid. No acute concerns. No consultations needed at this time. No indication for CIWA. Patient with consistently stable VS, no visible physical symptoms of withdrawal.   During the course of treatment, will collaborate with medical team to manage medical issues.  >Diet: Regular  >Social: milieu/structured therapy  >Treatment Interventions: Groups and Individual Therapy/CBT, Motivational counseling for substance abuse related issues.   >Dispo: Collateral and dispo planning pending further symptom and medication optimization

## 2024-10-03 NOTE — BH SOCIAL WORK INITIAL PSYCHOSOCIAL EVALUATION - NSCMSPTSTRENGTHS_PSY_ALL_CORE
Compliance to treatment/Expressive of emotions/Intact family/Intelligence/Motivated/Physically healthy/Resourceful/Strong support system/Supportive family Able to adapt/Compliance to treatment/Expressive of emotions/Financial stability/Future/goal oriented/Highly motivated for treatment/Humor/Intact employment/Intact family/Intelligence/Interpersonal skills/Leisure interest/Motivated/Physically healthy/Positive attitude/Resourceful/Strong support system/Successful coping history/Supportive family

## 2024-10-03 NOTE — BH SOCIAL WORK INITIAL PSYCHOSOCIAL EVALUATION - OTHER PAST PSYCHIATRIC HISTORY (INCLUDE DETAILS REGARDING ONSET, COURSE OF ILLNESS, INPATIENT/OUTPATIENT TREATMENT)
Pt is a 30YO single [place] American white woman domiciled in a private residence alone in [place], BIB sister for worsening anxiety and increasing SI with plan to OD on brother's insulin. PPH of anxiety, depression though pt stated she is not depressed but primarily anxious at this time, first psychiatric hospitalization, currently in outpt tx with her psychiatrist Dr. Carrera since early September 2024, started on Zoloft, last saw him yesterday for session. Pt denied current SIHIIP/AVH/paranoia/yajaira. Pt endorsed that she has been feeling increasingly anxious over the past month which is what she states is the cause of the SI. Pt stated that she had SI to OD on her brother's insulin, has means, but no intent. When asked what made her not go through with ending her life, she stated that it wasn't a solution to anything. Pt stated that when she is anxious she experiences heart palpitations, pacing, and racing thoughts. Pt denied sx of depression currently. Pt stated that she has a hx of panic attacks but has not experiences any recently. 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 Pt described her sleep as inconsistent at baseline stating that she sometimes sleeps through the night and sometimes she wakes up a lot. Pt stated that her appetite and concentration are good and at baseline.  Pt is a 30YO single [place] American white woman domiciled in a private residence alone in [place], BIB sister for worsening anxiety and increasing SI with plan to OD on brother's insulin. PPH of anxiety, depression though pt stated she is not depressed but primarily anxious at this time, first psychiatric hospitalization, currently in outpt tx with her psychiatrist Dr. Carrera since early 2024, started on Zoloft, last saw him yesterday for session. Pt denied current SIHIIP/AVH/paranoia/yajaira. Pt endorsed that she has been feeling increasingly anxious over the past month which is what she states is the cause of the SI. Pt stated that she had SI to OD on her brother's insulin, has means, but no intent. When asked what made her not go through with ending her life, she stated that it wasn't a solution to anything. Pt stated that when she is anxious she experiences heart palpitations, pacing, and racing thoughts. Pt denied sx of depression currently. Pt stated that she has a hx of panic attacks but has not experiences any recently. 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 Pt described her sleep as inconsistent at baseline stating that she sometimes sleeps through the night and sometimes she wakes up a lot. Pt stated that her appetite and concentration are good and at baseline. Pt stated that she has a family hx of undiagnosed mental illness; stated her whole family is anxious and depressed and stated she has an Uncle who is bipolar. Pt stated that her father was an alcoholic and  in  of cardiac arrest. Pt denied having access to firearms.   Pt denied hx of substance use/misuse.  Pt denied legal hx.   PMH of Hashimoto's, Lyme Disease, and insulin resistance.   Pt is domiciled in [place], lives alone, denied having a significant other and doesn't have kids. Pt described a great upbringing, stating that her family is "loud, obnoxious and [place]." Pt stated that her parents  when she was younger. Pt stated that she has a 35YO sister, and 30YO and 46YO brothers; youngest in the sibling lineup. Pt stated that her family is her support network and are always there for her. Pt stated that she has close friends that are also part of her support network. Pt stated that she is of non-practicing Hinduism mary ellen. 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 Pt stated that she is applying for her Master's Degree to become an NP. Pt stated that she loves her job and going to work grounds her. Pt stated she doesn't feel any anxiety at work as she feels she is in her "element." Pt stated that she enjoys going to the gym, and watching reality TV.    Pt provided verbal consent to speak with her psychiatrist, Uncle, and sister:  Dr. Juliane Evans/Sister: [phone]  Jose Antonio/Uncle: [phone]    Pt has BCBS insurance, stated that she provided it in the ED but they declined to take it. SW to follow up.

## 2024-10-03 NOTE — BH PSYCHOLOGY - GROUP THERAPY NOTE - NSPSYCHOLGRPCOGGOAL_PSY_A_CORE
Needs appointment, labs per protocol    
Please see request for refill       Patient not seen in clinic since 1/15/2016    
other...

## 2024-10-03 NOTE — BH SOCIAL WORK INITIAL PSYCHOSOCIAL EVALUATION - DETAILS
PT stated that her father was an alcoholic.  Pt stated that her whole family has undiagnosed anxiety and depression, stated that she has an uncle with bipolar disorder.

## 2024-10-03 NOTE — BH TREATMENT PLAN - NSCMSPTSTRENGTHS_PSY_ALL_CORE
Compliance to treatment/Expressive of emotions/Intact family/Intelligence/Motivated/Physically healthy/Resourceful/Strong support system/Supportive family

## 2024-10-03 NOTE — PSYCHIATRIC REHAB INITIAL EVALUATION - NSBHPRRECOMMEND_PSY_ALL_CORE
and LULY met with pt together, introduced self and clinical team.  has oriented psychiatric rehabilitation department service, unit activities and programming. Pt was provided with a copy of unit schedule and welcome letter. Writer has encouraged pt to attend daily symptom management groups.  During this assessment, pt is alert, verbal, pleasant, cooperative. Pt agreed to work on coping skills. Pt gave consent to talk to her psychiatrist and her sister.      Pt is a 30 y/o female. Pt has no prior psychiatric admission. Pt denies hx of trauma, SI/SIB. Pt is currently in treatment with Dr. Cardozo since first week of September and currently taking 25mg Zolof. Pt was admitted to [place] [address] due to worsening of anxiety and suicidal ideation. Pt endorsed SI to use her brother’s insulin. Pt denies any intent and stated she knows that’s not logical. Pt endorsed heart heart palpitations, pacing, racing thoughts, not consistent of sleep. Chart indicated, pt reported difficulty concentration, preservative thoughts, poor appetite, insomnia, feelings sadness, anhedonia, hopelessness/ helplessness, crying episode, and increased suicidal ideation. Pt currently denies SI, HI, AH, VH, paranoid, yajaira. Pt denies substance use and legal history. 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 Pt denies trauma history and admitted she had difficulty for one of the patient situation, but no longer has issue anymore.

## 2024-10-03 NOTE — BH INPATIENT PSYCHIATRY ASSESSMENT NOTE - HPI (INCLUDE ILLNESS QUALITY, SEVERITY, DURATION, TIMING, CONTEXT, MODIFYING FACTORS, ASSOCIATED SIGNS AND SYMPTOMS)
Patient was seen and evaluated, chart, medications and labs reviewed. Case discussed with nursing team.  On service for this 29 year old single female, no dependents.  Patient domiciles in alone private residence, currently employed as an RN at [employer].  Patient has PPH Depression Disorder.  Patient has no prior inpatient hospitalizations, sees outpatient psychiatrist Dr Cardozo and is on Zoloft.  Patient brought into hospital by sister due to worsening depression and anxiety with SI. Patient is hospitalized with a primary problem of emotional decompensation.  Patient admitted to [facility] on a 9.13 legal status. I have reviewed the initial psychiatric assessment in the electronic medical record, including the history of present illness, past psychiatric history, family/social history (no pertinent changes), and exam, and have confirmed the salient findings dated  10/2/24:  As per chart review, transferring records indicated the following:  the patient is 28 yo single; employed as an RN at [employer], non-caregiver, resides alone; no HX of trauma, no Hx of substance abuse, PMHx of insulin resistance and hypothyroidism; PPHx; no psych admissions, no Hx of SA,  no HX of psych admissions, Hx of depression was BIB her sister secondary to worsening of depression and anxiety and SI.  During evaluation patient is cooperative, but anxious, she reports that she has been suffering from depression and anxiety "off and on" , she is currently in treatment with Dr Cardozo in [place], on zoloft. She reports that last week her depression and anxiety got worse, does not know the trigger. She reports that she is sleeping "off and on" Her energy level is far, she is able to concentrate for the most of the time, She denies any excessive guilt.; denies feeling hopeless, but sometimes she feels helpless. She reports that she started feeling very anxious and "developed" SI, she states that she feels like OD herself on her brother's insulin. She denies any intent at present time. No anhedonia, states that she is able to work, No psychotic or manic symptoms, no voices, visions or delusions, no diminished need for sleep or goal directed activity, no pressured speech or racing thoughts. patient denies any Ah/VH, no paranoia, IOR, grandiosity.    On unit:   Patient was seen and evaluated, chart, medications and labs reviewed. Case discussed with nursing team.  On service for this 29 year old single female, no dependents.  Patient domiciles in alone private residence, currently employed as an RN at [employer].  Patient has PPH Depression Disorder.  Patient has no prior inpatient hospitalizations, sees outpatient psychiatrist Dr Cardozo and is on Zoloft.  Patient brought into hospital by sister due to worsening depression and anxiety with SI. Patient is hospitalized with a primary problem of emotional decompensation.  Patient admitted to [facility] on a 9.13 legal status. I have reviewed the initial psychiatric assessment in the electronic medical record, including the history of present illness, past psychiatric history, family/social history (no pertinent changes), and exam, and have confirmed the salient findings dated  10/2/24:  As per chart review, transferring records indicated the following:  the patient is 28 yo single; employed as an RN at [employer], non-caregiver, resides alone; no HX of trauma, no Hx of substance abuse, PMHx of insulin resistance and hypothyroidism; PPHx; no psych admissions, no Hx of SA,  no HX of psych admissions, Hx of depression was BIB her sister secondary to worsening of depression and anxiety and SI.  During evaluation patient is cooperative, but anxious, she reports that she has been suffering from depression and anxiety "off and on" , she is currently in treatment with Dr Cardozo in [place], on zoloft. She reports that last week her depression and anxiety got worse, does not know the trigger. She reports that she is sleeping "off and on" Her energy level is far, she is able to concentrate for the most of the time, She denies any excessive guilt.; denies feeling hopeless, but sometimes she feels helpless. She reports that she started feeling very anxious and "developed" SI, she states that she feels like OD herself on her brother's insulin. She denies any intent at present time. No anhedonia, states that she is able to work, No psychotic or manic symptoms, no voices, visions or delusions, no diminished need for sleep or goal directed activity, no pressured speech or racing thoughts. patient denies any Ah/VH, no paranoia, IOR, grandiosity.    On unit:  Upon interaction, patient calm and cooperative with interview and assessment. Per patient, she is here because, "I was feeling very anxious and started to feel suicidal" Discussed precipitant events leading to warrant inpatient hospitalization. Patient reports onset of anxiety symptoms began  during her adolescent years. 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  Patient reports she had been doing well for several years, but early this year in Feb through March she began to feel anxious, which worsened over the summer.  At first patient could not identify any specific triggers to her anxiety (but as session went on pt reported going through/witnessing  a traumatic experience at work (pt is an trauma ER nurse).  Patient reports more recently at the start of this month her anxiety progressed to suicidal thoughts with plan to inject herself with her brothers insulin.  Patient has history of seeing a therapist in the beginning of the year, but more recently started seeing a psychiatrist beginning of this month who started her on Zoloft 25mg.  Patient has no history of inpatient psychiatric hospitalizations. patient reports worsening mood  and worsening anxiety. Patient reports symptoms were persistent and prominent.   She reports symptoms of difficulty concentrating, perseverative thoughts, poor appetite, insomnia, feelings of sadness, anhedonia, hopelessness/emptiness, worthlessness, crying episodes. Increasing thoughts of suicide that became difficult to control. Patient denies any preparatory acts, just thoughts.  Patient reports depressive symptoms began to interfere with their thinking and functionality. Patient reports they have trouble doing normal day-to-day activities.   On unit she denies any current suicidal thoughts, or negative thoughts to self-harm. reports  hx of 1 episode of self-injurious behaviors (via cutting while in middle school) no thoughts to harm others, no plan or intent on the unit, engages in safety planning.  Patient has family hx of mental illness (per pts mother, pts uncle on fathers side dx w/bipolar and grandfather on fathers side committed suicide, and pts father is  from complications related to alcoholism)  No overt psychotic trend. Patient denies any hx or current AVH, delusions, psychotic disorganization, mind reading abilities, thought insertion, ideas of reference, special connolly, or thought broadcasting or paranoia. Denies history of aggression or violence. No access to firearm. No history of arson (pyromania). Patient denies any symptoms suggestive of active yajaira: (denied grandiosity/ racing thoughts/ increased goal directed activities or engaged in risk taking behavior/ no pressured speech/ no elevated mood/ denied any increased in energy level causing sleep disruption), no signs of catatonia (with no signs of mutism, negativism, stereotypy, echolalia, echopraxia, posturing or rigidity. Patient was alert and able to answer appropriately to questions during exam.  Denies obsessive, intrusive and persistent thoughts or compulsive, ritualistic acts are reported. Patient denies any active legal issues, is not currently under any kind of court supervision.   denies any other illicit drug use, no alcohol/vaping/tobacco (no need for NRT).  Patient  denies past sexual/physical abuse.  PMH: Hypothyroid, Hashimoto disease, Lyme disease

## 2024-10-04 VITALS — RESPIRATION RATE: 16 BRPM

## 2024-10-04 RX ORDER — SERTRALINE HYDROCHLORIDE 100 MG/1
1 TABLET, FILM COATED ORAL
Qty: 0 | Refills: 0 | DISCHARGE
Start: 2024-10-04

## 2024-10-04 RX ORDER — SERTRALINE HYDROCHLORIDE 100 MG/1
1 TABLET, FILM COATED ORAL
Qty: 30 | Refills: 0
Start: 2024-10-04 | End: 2024-11-02

## 2024-10-04 RX ADMIN — Medication 125 MICROGRAM(S): at 06:37

## 2024-10-04 RX ADMIN — SERTRALINE HYDROCHLORIDE 50 MILLIGRAM(S): 100 TABLET, FILM COATED ORAL at 08:41

## 2024-10-04 NOTE — BH INPATIENT PSYCHIATRY DISCHARGE NOTE - HPI (INCLUDE ILLNESS QUALITY, SEVERITY, DURATION, TIMING, CONTEXT, MODIFYING FACTORS, ASSOCIATED SIGNS AND SYMPTOMS)
Patient was seen and evaluated, chart, medications and labs reviewed. Case discussed with nursing team.  On service for this 29 year old single female, no dependents.  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  Patient has PPH Depression Disorder.  Patient has no prior inpatient hospitalizations, sees outpatient psychiatrist Dr Cardozo and is on Zoloft.  Patient brought into hospital by sister due to worsening depression and anxiety with SI. Patient is hospitalized with a primary problem of emotional decompensation.  Patient admitted to [facility] on a 9.13 legal status. 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  During evaluation patient is cooperative, but anxious, she reports that she has been suffering from depression and anxiety "off and on" , she is currently in treatment with Dr Cardozo in [place], on zoloft. She reports that last week her depression and anxiety got worse, does not know the trigger. She reports that she is sleeping "off and on" Her energy level is far, she is able to concentrate for the most of the time, She denies any excessive guilt.; denies feeling hopeless, but sometimes she feels helpless. She reports that she started feeling very anxious and "developed" SI, she states that she feels like OD herself on her brother's insulin. She denies any intent at present time. No anhedonia, states that she is able to work, No psychotic or manic symptoms, no voices, visions or delusions, no diminished need for sleep or goal directed activity, no pressured speech or racing thoughts. patient denies any Ah/VH, no paranoia, IOR, grandiosity.    On unit:  Upon interaction, patient calm and cooperative with interview and assessment. Per patient, she is here because, "I was feeling very anxious and started to feel suicidal" Discussed precipitant events leading to warrant inpatient hospitalization. Patient reports onset of anxiety symptoms began  during her adolescent years. 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  Patient reports she had been doing well for several years, but early this year in Feb through March she began to feel anxious, which worsened over the summer.  At first patient could not identify any specific triggers to her anxiety (but as session went on pt reported going through/witnessing  a traumatic experience at work (pt is an trauma ER nurse).  Patient reports more recently at the start of this month her anxiety progressed to suicidal thoughts with plan to inject herself with her brothers insulin.  Patient has history of seeing a therapist in the beginning of the year, but more recently started seeing a psychiatrist beginning of this month who started her on Zoloft 25mg.  Patient has no history of inpatient psychiatric hospitalizations. patient reports worsening mood  and worsening anxiety. Patient reports symptoms were persistent and prominent.   She reports symptoms of difficulty concentrating, perseverative thoughts, poor appetite, insomnia, feelings of sadness, anhedonia, hopelessness/emptiness, worthlessness, crying episodes. Increasing thoughts of suicide that became difficult to control. Patient denies any preparatory acts, just thoughts.  Patient reports depressive symptoms began to interfere with their thinking and functionality. Patient reports they have trouble doing normal day-to-day activities.   On unit she denies any current suicidal thoughts, or negative thoughts to self-harm. reports  hx of 1 episode of self-injurious behaviors (via cutting while in middle school) no thoughts to harm others, no plan or intent on the unit, engages in safety planning.  Patient has family hx of mental illness (per pts mother, pts uncle on fathers side dx w/bipolar and grandfather on fathers side committed suicide, and pts father is  from complications related to alcoholism)  No overt psychotic trend. Patient denies any hx or current AVH, delusions, psychotic disorganization, mind reading abilities, thought insertion, ideas of reference, special connolly, or thought broadcasting or paranoia. Denies history of aggression or violence. No access to firearm. No history of arson (pyromania). Patient denies any symptoms suggestive of active yajaira: (denied grandiosity/ racing thoughts/ increased goal directed activities or engaged in risk taking behavior/ no pressured speech/ no elevated mood/ denied any increased in energy level causing sleep disruption), no signs of catatonia (with no signs of mutism, negativism, stereotypy, echolalia, echopraxia, posturing or rigidity. Patient was alert and able to answer appropriately to questions during exam.  Denies obsessive, intrusive and persistent thoughts or compulsive, ritualistic acts are reported. Patient denies any active legal issues, is not currently under any kind of court supervision.   denies any other illicit drug use, no alcohol/vaping/tobacco (no need for NRT).  Patient  denies past sexual/physical abuse.  PMH: Hypothyroid, Hashimoto disease, Lyme disease

## 2024-10-04 NOTE — BH INPATIENT PSYCHIATRY DISCHARGE NOTE - HOSPITAL COURSE
Patient is a 29 year old single female, no dependents.  Patient domiciles in alone private residence, currently employed as an RN at [employer].  Patient has PPH Depression Disorder and Anxiety.  Patient has no prior inpatient hospitalizations, sees outpatient psychiatrist Dr Cardozo and is on Zoloft 25mg (recently started Zoloft a few weeks ago).  Patient brought into hospital by sister due to worsening depression and anxiety with SI to inject her brothers insulin. On unit she denies SI/SIB/HI. No overt psychotic trend. No behavioral concerns. Patient's zoloft was increased to 50mg, tolerating well, free of any SE.     Pt submitted 3 day letter on Thursday 10/3. Pt reports that she reached out to her psychiatrist for an appointment (appointment confirmed but scheduled for an earlier date by LULY) and states that she reached out for individual therapy prior to IPP. Pt states that she is being supported by her family and friends, has plans to stay with her mother upon discharge for a few days, and denies any current SI/SA/NSSIB. Pt able to safety plan accordingly. Pt denied medications as she states that she was recently prescribed one month supply of zoloft 50mg by her outpatient psychiatrist, picked up from her pharmacy a few days prior to admission. 3 day letter granted and patient discharged.     Plan:  >Legal: 9.13  >Obs: Routine, no current SI. no need for CO, patient not expected to pose risk to self or others in controlled inpatient setting  >Psychiatric Meds: Continue outpatient medication regimen: Observe for tolerability and efficacy.   -Zoloft 50mg daily for depression/anxiety  PRN medications:  Ativan 2mg oral Q6HR PRN for agitation and anxiety.  Haldol 5mg oral Q6HR PRN for agitation.   Benadryl 50mg oral Q6HR PRN for agitation.   >Labs: Admission labs reviewed, no acute findings. Labs pending for today: A1c and Lipid panel. Hold antipsychotics if QTc >500  >Medical:  Hypothyroid. No acute concerns. No consultations needed at this time. No indication for CIWA. Patient with consistently stable VS, no visible physical symptoms of withdrawal.   During the course of treatment, will collaborate with medical team to manage medical issues.  >Diet: Regular  >Social: milieu/structured therapy  >Treatment Interventions: Groups and Individual Therapy/CBT, Motivational counseling for substance abuse related issues.

## 2024-10-04 NOTE — BH INPATIENT PSYCHIATRY DISCHARGE NOTE - NSICDXPASTSURGICALHX_GEN_ALL_CORE_FT
[facility]        PAST SURGICAL HISTORY:  No significant past surgical history

## 2024-10-04 NOTE — BH INPATIENT PSYCHIATRY DISCHARGE NOTE - NSBHASSESSSUMMFT_PSY_ALL_CORE
On day of discharge, pt encouraged to continue medication regiment. Pt educated on importance of monitoring for worsening symptoms, and to call 911 or go to the nearest emergency department if not feeling safe in the community. Pt provided with numbers to Suicide Prevention and [facility] and educated on their use. Pt voiced understanding to education. Pt was provided with one month supply of medication and a follow up appointment with outpatient psychiatric services. Pt reported feeling safe for discharge and to continue treatment with outpatient psychiatrist Dr. Darling.     Pt is at a chronic risk to self injury due to, but not limited by: serious mental illness, serious medical illness, hx of alcohol use. Protective factors include: supportive family, consistent outpatient treatment.

## 2024-10-04 NOTE — BH INPATIENT PSYCHIATRY DISCHARGE NOTE - NSBHMETABOLIC_PSY_ALL_CORE_FT
BMI: BMI (kg/m2): 22.4 (10-03-24 @ 01:33)  HbA1c: A1C with Estimated Average Glucose Result: 4.9 % (10-03-24 @ 08:00)    Glucose: POCT Blood Glucose.: 97 mg/dL (10-02-24 @ 21:26)    BP: 115/72 (10-03-24 @ 00:09) (115/72 - 128/89)Vital Signs Last 24 Hrs  T(C): 36.7 (10-04-24 @ 06:23), Max: 36.7 (10-04-24 @ 06:23)  T(F): 98.1 (10-04-24 @ 06:23), Max: 98.1 (10-04-24 @ 06:23)  HR: --  BP: --  BP(mean): --  RR: --  SpO2: --    Orthostatic VS  10-04-24 @ 06:23  Lying BP: --/-- HR: --  Sitting BP: 119/75 HR: 105  Standing BP: 116/74 HR: 120  Site: --  Mode: --  Orthostatic VS  10-03-24 @ 01:33  Lying BP: --/-- HR: --  Sitting BP: 115/80 HR: 95  Standing BP: 98/80 HR: 109  Site: --  Mode: --    Lipid Panel: Date/Time: 10-03-24 @ 08:00  Cholesterol, Serum: 229  LDL Cholesterol Calculated: 158  HDL Cholesterol, Serum: 56  Total Cholesterol/HDL Ration Measurement: --  Triglycerides, Serum: 73

## 2024-10-04 NOTE — BH SAFETY PLAN - WARNING SIGN 3
Your refill of alprazolam XR is due on December 28.  Your refill will be available on December 23 reason is travel  Use [facility] if needed.  Let us know if you need help at the pharmacy  
keep myself busy all the time

## 2024-10-04 NOTE — BH DISCHARGE NOTE NURSING/SOCIAL WORK/PSYCH REHAB - PATIENT PORTAL LINK FT
You can access the FollowMyHealth Patient Portal offered by [facility] by registering at the following website: [email or web address]. By joining [facility]’s FollowMyHealth portal, you will also be able to view your health information using other applications (apps) compatible with our system.

## 2024-10-04 NOTE — BH DISCHARGE NOTE NURSING/SOCIAL WORK/PSYCH REHAB - NSDCPRGOAL_PSY_ALL_CORE
Pt submitted 3DLS. Pt made some progress towards her discharge. Pt has identified her coping skills such as go for walk, the hand , and watching documentary. Pt currently denies SI, HI, AH, and VH. Pt has verbalized the importance of having open communication with others. Pt has demonstrated compliant with medication. During this brief admission, pt showed approximately 95% of group attendance. During the group session, pt was able to tolerate group structure, quiet, attentive, participated relevantly with prompting. Pt was visible on the unit, interacts well with others. Pt maintained fair ADLS. Pt has participated in her safety plan.

## 2024-10-04 NOTE — BH DISCHARGE NOTE NURSING/SOCIAL WORK/PSYCH REHAB - NSDCADDINFO1FT_PSY_ALL_CORE
At this time pt has submitted a 72 hour letter, treatment team has assessed pt clinical stability and patient is being discharged today on the letter. Pt has previously confirmed follow up with Dr. Saenz on 10/9/2024 at 2PM.

## 2024-10-04 NOTE — BH INPATIENT PSYCHIATRY DISCHARGE NOTE - NSBHDCRISKMITIGATE_PSY_ALL_CORE
Safety planning/Family/Other social support involvement/Medications targeting suicidality/non-suicidal self injurious behavior/Assisted outpatient treatment

## 2024-10-04 NOTE — BH INPATIENT PSYCHIATRY DISCHARGE NOTE - NSDCCPCAREPLAN_GEN_ALL_CORE_FT
PRINCIPAL DISCHARGE DIAGNOSIS  Diagnosis: MDD (major depressive disorder)  Assessment and Plan of Treatment:       SECONDARY DISCHARGE DIAGNOSES  Diagnosis: Hashimoto's disease  Assessment and Plan of Treatment:     Diagnosis: Hypothyroidism  Assessment and Plan of Treatment:     Diagnosis: Anxiety  Assessment and Plan of Treatment:

## 2024-10-04 NOTE — BH INPATIENT PSYCHIATRY DISCHARGE NOTE - NSBHFUPINTERVALHXFT_PSY_A_CORE
Discharge Progress Note Date and Time: 10-04-24 @ 11:16    Pt has continued to show improvement in symptoms. Pt states that depression is much improved, denying anxiety. On day of discharge, pt denies SI/HI/AVH and behavior has been well controlled. Pt reports that sleep and appetite have returned to baseline. Pt has been fully engaged in treatment on the unit, compliant with medications, and is in agreement to continue care in the outpatient setting. Pt is supported by family, who are protective factors. Pt is able to safety plan appropriately. Pt's risk cannot be further reduced with continued inpatient hospitalization. Pt is no longer an acute danger to self or others, and is stable for discharge home.

## 2024-10-04 NOTE — BH INPATIENT PSYCHIATRY DISCHARGE NOTE - NSTOBACCOUSAGEY/N_GEN_A_CS
No Area M Indication Text: Tumors in this location are included in Area M (cheek, forehead, scalp, neck, jawline, and pretibial skin). Mohs surgery is indicated for tumors in these anatomic locations. Tissue conservation is critical in these anatomic locations to maximize functional and aesthetic outcomes.\\n\\n<b>Detailed documentation of histology, including histopathologic findings on microscopic examination of the tumor during today's Mohs procedure, are notated along with the Mohs maps from each stage of Mohs micrographic surgery.</b>

## 2024-10-04 NOTE — BH DISCHARGE NOTE NURSING/SOCIAL WORK/PSYCH REHAB - DISCHARGE INSTRUCTIONS AFTERCARE APPOINTMENTS
In order to check the location, date, or time of your aftercare appointment, please refer to your Discharge Instructions Document given to you upon leaving the hospital.  If you have lost the instructions please call [phone]

## 2024-10-04 NOTE — BH INPATIENT PSYCHIATRY DISCHARGE NOTE - ATTENDING DISCHARGE PHYSICAL EXAMINATION:
I have personally seen and evaluated patient prior to discharge. Chart reviewed and discharge plan discussed with the NP as follows. Pt prior to discharge was calm, cooperative, friendly and smiling. Patient has been attending groups with active participation.  Pt interacting well with others. No recent behavioral problems and no episodes of aggressive or dangerous behavior. No problems with sleep, appetite or energy level. Denied any active and current manic, hypomanic, depressive, psychotic or anxiety symptoms. Denied any current SI/HI/AH/VH/PI. No overt delusions.  Patient is organized and commits to safety and to ongoing outpatient treatment.   Pt asks relevant questions about his discharge plan and about the medication regimen. Pt articulate a plan for living life after discharge. Medication risks/benefits/side effects were discussed with the patient.  Patient expressed understanding and agreed with the treatment plan. Further, instructed the patient to seek help immediately by dialing "911" or by going to the nearest ER if symptoms worsen.    Pt does not pose an acute elevated risk of imminent danger to harm to self or others. Does not require further inpatient psychiatric admission at this time. Psychiatrically cleared for discharge.   Pt urged to avoid using any alcohol or street drugs, particularly marijuana & K2, cocaine and methamphetamine (crystal meth) once discharged.  Safety plan filled out by patient and submitted into chart.

## 2024-10-08 ENCOUNTER — OUTPATIENT (OUTPATIENT)
Dept: OUTPATIENT SERVICES | Facility: HOSPITAL | Age: 29
LOS: 1 days | Discharge: ANOTHER HOSPITAL | End: 2024-10-08

## 2024-10-08 PROBLEM — E06.3 AUTOIMMUNE THYROIDITIS: Chronic | Status: ACTIVE | Noted: 2024-10-02

## 2024-10-10 DIAGNOSIS — F41.1 GENERALIZED ANXIETY DISORDER: ICD-10-CM

## 2024-11-01 NOTE — ED ADULT NURSE NOTE - NSFALLUNIVINTERV_ED_ALL_ED
Low Risk (score 7-11)
Bed/Stretcher in lowest position, wheels locked, appropriate side rails in place/Call bell, personal items and telephone in reach/Instruct patient to call for assistance before getting out of bed/chair/stretcher/Non-slip footwear applied when patient is off stretcher/[place] to call system/Physically safe environment - no spills, clutter or unnecessary equipment/Purposeful proactive rounding/Room/bathroom lighting operational, light cord in reach

## 2024-11-12 NOTE — BH SOCIAL WORK INITIAL PSYCHOSOCIAL EVALUATION - NSBHOASASADM_PSY_ALL_CORE
Additional Notes: These are looking much like \"deflated balloons\", so I think next visit we will be able to shave them then immediately do injections, and follow up with more injections.
Detail Level: Simple
Render Risk Assessment In Note?: no
No